# Patient Record
Sex: FEMALE | Race: OTHER
[De-identification: names, ages, dates, MRNs, and addresses within clinical notes are randomized per-mention and may not be internally consistent; named-entity substitution may affect disease eponyms.]

---

## 2019-12-25 ENCOUNTER — HOSPITAL ENCOUNTER (EMERGENCY)
Dept: HOSPITAL 61 - ER | Age: 74
Discharge: HOME | End: 2019-12-25
Payer: MEDICARE

## 2019-12-25 VITALS — DIASTOLIC BLOOD PRESSURE: 94 MMHG | SYSTOLIC BLOOD PRESSURE: 138 MMHG

## 2019-12-25 VITALS — BODY MASS INDEX: 39.07 KG/M2 | HEIGHT: 60 IN | WEIGHT: 199 LBS

## 2019-12-25 DIAGNOSIS — E11.9: ICD-10-CM

## 2019-12-25 DIAGNOSIS — J45.901: Primary | ICD-10-CM

## 2019-12-25 DIAGNOSIS — E03.9: ICD-10-CM

## 2019-12-25 DIAGNOSIS — J06.9: ICD-10-CM

## 2019-12-25 DIAGNOSIS — Z91.041: ICD-10-CM

## 2019-12-25 DIAGNOSIS — I10: ICD-10-CM

## 2019-12-25 PROCEDURE — 71046 X-RAY EXAM CHEST 2 VIEWS: CPT

## 2019-12-25 PROCEDURE — 94640 AIRWAY INHALATION TREATMENT: CPT

## 2019-12-25 PROCEDURE — 99284 EMERGENCY DEPT VISIT MOD MDM: CPT

## 2019-12-25 RX ADMIN — IPRATROPIUM BROMIDE AND ALBUTEROL SULFATE ONE ML: .5; 3 SOLUTION RESPIRATORY (INHALATION) at 19:47

## 2019-12-25 NOTE — RAD
CHEST PA   LATERAL

 

History: Cough, wheezing 

 

Comparison: None.

 

Findings: 

Frontal and lateral views of the chest were obtained. The 

cardiomediastinal silhouette is normal. Pulmonary vasculature is normal. 

The lungs are clear. No pleural effusion or pneumothorax is seen. There is

no acute bone abnormality. Degenerative changes of the cervical spine 

noted.

 

IMPRESSION: 

No acute cardiopulmonary process. 

 

 

Electronically signed by: Rashad Zaidi MD (12/25/2019 9:43 PM) [facility]
No

## 2019-12-25 NOTE — PHYS DOC
Adult General


Chief Complaint


Chief Complaint:  Congestion





HPI


HPI





74-year-old female presents to the emergency department with cough and 

congestion times one day. She does have positive sputum production. She 

describes symptoms started yesterday afternoon. She did have fever last night 

however that has subsequently resolved. She denies any nausea, vomiting, 

diarrhea. States her great-grandchildren have both had RSV.  Patient's un

derlying history of hypertension, diabetes, asthma, hypothyroid.  Nothing makes 

her symptoms worse, nothing makes her symptoms better on exam.





Review of Systems


Review of Systems





Constitutional: fever last night, not currently


Eyes: Denies change in visual acuity, redness, or eye pain []


HENT: + congestion


Respiratory: cough, SOB at times


Cardiovascular: No additional information not addressed in HPI []


GI: Denies abdominal pain, nausea, vomiting, bloody stools or diarrhea []


Neurologic: Denies headache, focal weakness or sensory changes []





All other systems were reviewed and found to be within normal limits, except as 

documented in this note.





Current Medications


Current Medications





Current Medications








 Medications


  (Trade)  Dose


 Ordered  Sig/Yann  Start Time


 Stop Time Status Last Admin


Dose Admin


 


 Albuterol/


 Ipratropium


  (Duoneb)  3 ml  1X  ONCE  12/25/19 19:45


 12/25/19 19:46 DC 12/25/19 19:47


3 ML


 


 Prednisone


  (Prednisone)  60 mg  1X  ONCE  12/25/19 19:45


 12/25/19 19:46 DC 12/25/19 19:43


60 MG











Allergies


Allergies





Allergies








Coded Allergies Type Severity Reaction Last Updated Verified


 


  Iodinated Contrast Media Allergy Intermediate  12/25/19 Yes











Physical Exam


Physical Exam





Constitutional: Well developed, well nourished, no acute distress, non-toxic 

appearance. []


HENT: Normocephalic, atraumatic, bilateral external ears normal, oropharynx 

moist, no oral exudates, nose normal. []


Neck: Normal range of motion, no tenderness, supple, no stridor. [] 


Cardiovascular:Heart rate regular rhythm, no murmur []


Lungs & Thorax:  decreased BS, occasional wheeze


Abdomen: Bowel sounds normal, soft, no tenderness, no masses, no pulsatile 

masses. [] 


Skin: Warm, dry, no erythema, no rash. [] 


Back: No tenderness, no CVA tenderness. [] 


Extremities: No tenderness, no edema. [] 


Neurologic: Alert and oriented X 3, no focal deficits noted. []


Psychologic: Affect normal, judgement normal, mood normal. []





Current Patient Data


Vital Signs





                                   Vital Signs








  Date Time  Temp Pulse Resp B/P (MAP) Pulse Ox O2 Delivery O2 Flow Rate FiO2


 


12/25/19 19:50      Room Air  


 


12/25/19 19:16 97.8 86 16 138/94 (109) 93   





 97.8       











EKG


EKG


[]





Radiology/Procedures


Radiology/Procedures


Wet read reveals no acute consolidation appreciated, official read pending[]





Course & Med Decision Making


Course & Med Decision Making


Pertinent Labs and Imaging studies reviewed. (See chart for details)





[]


74-year-old female presents to the emergency department with cough and 

congestion times one day. She does have positive sputum production. She 

describes symptoms started yesterday afternoon. She did have fever last night 

however that has subsequently resolved. She denies any nausea, vomiting, 

diarrhea. States her great-grandchildren have both had RSV.  Patient's 

underlying history of hypertension, diabetes, asthma, hypothyroid.  Nothing 

makes her symptoms worse, nothing makes her symptoms better on exam.





Duoneb x 1 


Prednisone 60mg po x 1


Xray without acute consolidation appreciated 


Discussed with patient - recommend follow up with PCP, continue albuterol as 

needed per home, plan steroid burst over the next 2 days


Discussed discharge plans with patient.





Dragon Disclaimer


Dragon Disclaimer


This electronic medical record was generated, in whole or in part, using a voice

 recognition dictation system.





Departure


Departure


Impression:  


   Primary Impression:  


   URI (upper respiratory infection)


   Additional Impression:  


   Acute asthma flare


Disposition:  01 HOME, SELF-CARE


Condition:  IMPROVED


Referrals:  


NO PCP (PCP)


Patient Instructions:  Asthma, Adult, Easy-to-Read, Upper Respiratory Infection,

 Adult, Easy-to-Read





Additional Instructions:  


Recommend follow up with PCP 3 - 5 days


Return to the ER with worsening symptoms, intractable pain, fever, altered 

mental status


Tylenol/Motrin as needed for pain


No antibiotics at this time


CXR without acute process identified


Prednisone 60mg po x 2 days


Scripts


[Prednisone] 10 mg  No Conflict Check


60 MG PO DAILY for 2 Days, #12 %


   Prov: VALENTE BURNS MD         12/25/19





Problem Qualifiers








   Primary Impression:  


   URI (upper respiratory infection)


   URI type:  unspecified URI  Qualified Codes:  J06.9 - Acute upper respiratory

    infection, unspecified


   Additional Impression:  


   Acute asthma flare


   Asthma severity:  mild  Asthma persistence:  unspecified  Qualified Codes:  

   J45.901 - Unspecified asthma with (acute) exacerbation








VALENTE BURNS MD              Dec 25, 2019 19:31

## 2019-12-28 ENCOUNTER — HOSPITAL ENCOUNTER (EMERGENCY)
Dept: HOSPITAL 61 - ER | Age: 74
Discharge: HOME | End: 2019-12-28
Payer: MEDICARE

## 2019-12-28 VITALS
SYSTOLIC BLOOD PRESSURE: 151 MMHG | SYSTOLIC BLOOD PRESSURE: 151 MMHG | DIASTOLIC BLOOD PRESSURE: 92 MMHG | DIASTOLIC BLOOD PRESSURE: 92 MMHG

## 2019-12-28 VITALS — WEIGHT: 200 LBS | BODY MASS INDEX: 39.27 KG/M2 | HEIGHT: 60 IN

## 2019-12-28 DIAGNOSIS — I10: ICD-10-CM

## 2019-12-28 DIAGNOSIS — Z91.041: ICD-10-CM

## 2019-12-28 DIAGNOSIS — E03.9: ICD-10-CM

## 2019-12-28 DIAGNOSIS — J45.909: Primary | ICD-10-CM

## 2019-12-28 DIAGNOSIS — E11.9: ICD-10-CM

## 2019-12-28 DIAGNOSIS — Z91.012: ICD-10-CM

## 2019-12-28 LAB
% LYMPHS: 7 % (ref 24–48)
% MONOS: 3 % (ref 0–10)
% SEGS: 89 % (ref 35–66)
ALBUMIN SERPL-MCNC: 3.7 G/DL (ref 3.4–5)
ALBUMIN/GLOB SERPL: 1 {RATIO} (ref 1–1.7)
ALP SERPL-CCNC: 175 U/L (ref 46–116)
ALT SERPL-CCNC: 27 U/L (ref 14–59)
ANION GAP SERPL CALC-SCNC: 7 MMOL/L (ref 6–14)
AST SERPL-CCNC: 22 U/L (ref 15–37)
BASOPHILS # BLD AUTO: 0.1 X10^3/UL (ref 0–0.2)
BASOPHILS NFR BLD: 1 % (ref 0–3)
BILIRUB SERPL-MCNC: 0.5 MG/DL (ref 0.2–1)
BUN SERPL-MCNC: 22 MG/DL (ref 7–20)
BUN/CREAT SERPL: 24 (ref 6–20)
CALCIUM SERPL-MCNC: 8.9 MG/DL (ref 8.5–10.1)
CHLORIDE SERPL-SCNC: 100 MMOL/L (ref 98–107)
CO2 SERPL-SCNC: 31 MMOL/L (ref 21–32)
CREAT SERPL-MCNC: 0.9 MG/DL (ref 0.6–1)
EOSINOPHIL NFR BLD AUTO: 1 % (ref 0–5)
EOSINOPHIL NFR BLD: 0.1 X10^3/UL (ref 0–0.7)
EOSINOPHIL NFR BLD: 1 % (ref 0–3)
ERYTHROCYTE [DISTWIDTH] IN BLOOD BY AUTOMATED COUNT: 12.9 % (ref 11.5–14.5)
GFR SERPLBLD BASED ON 1.73 SQ M-ARVRAT: 61.5 ML/MIN
GLOBULIN SER-MCNC: 3.6 G/DL (ref 2.2–3.8)
GLUCOSE SERPL-MCNC: 263 MG/DL (ref 70–99)
HCT VFR BLD CALC: 38.3 % (ref 36–47)
HGB BLD-MCNC: 12.5 G/DL (ref 12–15.5)
INFLUENZA A PATIENT: NEGATIVE
INFLUENZA B PATIENT: NEGATIVE
LYMPHOCYTES # BLD: 0.8 X10^3/UL (ref 1–4.8)
LYMPHOCYTES NFR BLD AUTO: 7 % (ref 24–48)
MCH RBC QN AUTO: 30 PG (ref 25–35)
MCHC RBC AUTO-ENTMCNC: 33 G/DL (ref 31–37)
MCV RBC AUTO: 91 FL (ref 79–100)
MONO #: 0.5 X10^3/UL (ref 0–1.1)
MONOCYTES NFR BLD: 5 % (ref 0–9)
NEUT #: 9.6 X10^3/UL (ref 1.8–7.7)
NEUTROPHILS NFR BLD AUTO: 87 % (ref 31–73)
PLATELET # BLD AUTO: 274 X10^3/UL (ref 140–400)
PLATELET # BLD EST: ADEQUATE 10*3/UL
POTASSIUM SERPL-SCNC: 3.2 MMOL/L (ref 3.5–5.1)
PROT SERPL-MCNC: 7.3 G/DL (ref 6.4–8.2)
RBC # BLD AUTO: 4.23 X10^6/UL (ref 3.5–5.4)
SODIUM SERPL-SCNC: 138 MMOL/L (ref 136–145)
WBC # BLD AUTO: 11.1 X10^3/UL (ref 4–11)

## 2019-12-28 PROCEDURE — 99285 EMERGENCY DEPT VISIT HI MDM: CPT

## 2019-12-28 PROCEDURE — 85007 BL SMEAR W/DIFF WBC COUNT: CPT

## 2019-12-28 PROCEDURE — 85025 COMPLETE CBC W/AUTO DIFF WBC: CPT

## 2019-12-28 PROCEDURE — 80053 COMPREHEN METABOLIC PANEL: CPT

## 2019-12-28 PROCEDURE — 71045 X-RAY EXAM CHEST 1 VIEW: CPT

## 2019-12-28 PROCEDURE — 84484 ASSAY OF TROPONIN QUANT: CPT

## 2019-12-28 PROCEDURE — 83605 ASSAY OF LACTIC ACID: CPT

## 2019-12-28 PROCEDURE — 36415 COLL VENOUS BLD VENIPUNCTURE: CPT

## 2019-12-28 PROCEDURE — 94640 AIRWAY INHALATION TREATMENT: CPT

## 2019-12-28 PROCEDURE — 85379 FIBRIN DEGRADATION QUANT: CPT

## 2019-12-28 PROCEDURE — 83880 ASSAY OF NATRIURETIC PEPTIDE: CPT

## 2019-12-28 PROCEDURE — 87804 INFLUENZA ASSAY W/OPTIC: CPT

## 2019-12-28 PROCEDURE — 93005 ELECTROCARDIOGRAM TRACING: CPT

## 2019-12-28 NOTE — PHYS DOC
Past Medical History


Past Medical History:  Asthma, Diabetes-Type II, Hypertension, Hypothyroid


Past Surgical History:  Cholecystectomy, Hysterectomy, Tubal ligation


Additional Past Surgical Histo:  KIDNEY STENTS


Alcohol Use:  None


Drug Use:  None





Adult General


Chief Complaint


Chief Complaint:  SHORTNESS OF BREATH





HPI


HPI





74-year-old female with underlying history of asthma, hypertension presents to [facility] emergency department with complaints of shortness of breath, wheeze. Patient 

was seen here on [address] by me with diagnosis of URI, steroids were 

provided, nebulizer treatments with improvement. Socially discharge home, states

that over the last couple days she's had increasing shortness of breath, 

difficulty lying flat, more productive cough as well as chest pressure. She 

denies any headache or visual change, abdominal pain, nausea, vomiting.





Review of Systems


Review of Systems





Constitutional: Denies fever or chills []


HENT: Denies nasal congestion or sore throat []


Respiratory: Cough/SOB


Cardiovascular: No additional information not addressed in HPI []


GI: Denies abdominal pain, nausea, vomiting, bloody stools or diarrhea []


Neurologic: Denies headache, focal weakness or sensory changes []








All other systems were reviewed and found to be within normal limits, except as 

documented in this note.





Current Medications


Current Medications





Current Medications








 Medications


  (Trade)  Dose


 Ordered  Sig/Yann  Start Time


 Stop Time Status Last Admin


Dose Admin


 


 Albuterol/


 Ipratropium


  (Duoneb)  3 ml  1X  ONCE  12/28/19 21:45


 12/28/19 21:46 DC 12/28/19 22:16


3 ML


 


 Potassium Chloride


  (Klor-Con)  40 meq  1X  ONCE  12/28/19 22:45


 12/28/19 22:46 DC 12/28/19 22:47


40 MEQ











Allergies


Allergies





Allergies








Coded Allergies Type Severity Reaction Last Updated Verified


 


  Iodinated Contrast Media Allergy Intermediate  12/25/19 Yes


 


  egg Allergy Unknown  12/28/19 Yes











Physical Exam


Physical Exam





Constitutional: Well developed, well nourished, mild distress, non-toxic 

appearance. []


HENT: Normocephalic, atraumatic, bilateral external ears normal, oropharynx 

moist, no oral exudates, nose normal. []


Eyes: PERRLA, EOMI, conjunctiva normal, no discharge. [] 


Cardiovascular:Heart rate regular rhythm, no murmur []


Lungs & Thorax:  decreased BS, occasional wheeze


Abdomen: Bowel sounds normal, soft, no tenderness, no masses, no pulsatile 

masses. [] 


Skin: Warm, dry, no erythema, no rash. [] 


Extremities: No tenderness, no edema. [] 


Neurologic: Alert and oriented X 3, no focal deficits noted. []


Psychologic: Affect normal, judgement normal, mood normal. []





Current Patient Data


Vital Signs





                                   Vital Signs








  Date Time  Temp Pulse Resp B/P (MAP) Pulse Ox O2 Delivery O2 Flow Rate FiO2


 


12/28/19 22:15     95 Room Air  


 


12/28/19 21:16 98.2 88 20 160/100 (120)    





 98.2       








Lab Values





                                Laboratory Tests








Test


 12/28/19


21:10 12/28/19


21:50


 


White Blood Count


 11.1 x10^3/uL


(4.0-11.0)  H 





 


Red Blood Count


 4.23 x10^6/uL


(3.50-5.40) 





 


Hemoglobin


 12.5 g/dL


(12.0-15.5) 





 


Hematocrit


 38.3 %


(36.0-47.0) 





 


Mean Corpuscular Volume


 91 fL ()


 





 


Mean Corpuscular Hemoglobin 30 pg (25-35)   


 


Mean Corpuscular Hemoglobin


Concent 33 g/dL


(31-37) 





 


Red Cell Distribution Width


 12.9 %


(11.5-14.5) 





 


Platelet Count


 274 x10^3/uL


(140-400) 





 


Neutrophils (%) (Auto) 87 % (31-73)  H 


 


Lymphocytes (%) (Auto) 7 % (24-48)  L 


 


Monocytes (%) (Auto) 5 % (0-9)   


 


Eosinophils (%) (Auto) 1 % (0-3)   


 


Basophils (%) (Auto) 1 % (0-3)   


 


Neutrophils # (Auto)


 9.6 x10^3/uL


(1.8-7.7)  H 





 


Lymphocytes # (Auto)


 0.8 x10^3/uL


(1.0-4.8)  L 





 


Monocytes # (Auto)


 0.5 x10^3/uL


(0.0-1.1) 





 


Eosinophils # (Auto)


 0.1 x10^3/uL


(0.0-0.7) 





 


Basophils # (Auto)


 0.1 x10^3/uL


(0.0-0.2) 





 


Segmented Neutrophils % 89 % (35-66)  H 


 


Lymphocytes % 7 % (24-48)  L 


 


Monocytes % 3 % (0-10)   


 


Eosinophils % 1 % (0-5)   


 


Platelet Estimate


 Adequate


(ADEQUATE) 





 


D-Dimer (Missy)


 0.78 ug/mlFEU


(0.00-0.50)  H 





 


Sodium Level


 138 mmol/L


(136-145) 





 


Potassium Level


 3.2 mmol/L


(3.5-5.1)  L 





 


Chloride Level


 100 mmol/L


() 





 


Carbon Dioxide Level


 31 mmol/L


(21-32) 





 


Anion Gap 7 (6-14)   


 


Blood Urea Nitrogen


 22 mg/dL


(7-20)  H 





 


Creatinine


 0.9 mg/dL


(0.6-1.0) 





 


Estimated GFR


(Cockcroft-Gault) 61.5  


 





 


BUN/Creatinine Ratio 24 (6-20)  H 


 


Glucose Level


 263 mg/dL


(70-99)  H 





 


Lactic Acid Level


 1.4 mmol/L


(0.4-2.0) 





 


Calcium Level


 8.9 mg/dL


(8.5-10.1) 





 


Total Bilirubin


 0.5 mg/dL


(0.2-1.0) 





 


Aspartate Amino Transferase


(AST) 22 U/L (15-37)


 





 


Alanine Aminotransferase (ALT)


 27 U/L (14-59)


 





 


Alkaline Phosphatase


 175 U/L


()  H 





 


Troponin I Quantitative


 < 0.017 ng/mL


(0.000-0.055) 





 


NT-Pro-B-Type Natriuretic


Peptide 341 pg/mL


(0-124)  H 





 


Total Protein


 7.3 g/dL


(6.4-8.2) 





 


Albumin


 3.7 g/dL


(3.4-5.0) 





 


Albumin/Globulin Ratio 1.0 (1.0-1.7)   


 


Influenza Type A Antigen


 


 Negative


(NEGATIVE)


 


Influenza Type B Antigen


 


 Negative


(NEGATIVE)





                                Laboratory Tests


12/28/19 21:10








                                Laboratory Tests


12/28/19 21:10











EKG


EKG


EKG reveals normal sinus rhythm, heart rate 85, left axis deviation, no evidence

 of ST elevation MI, interpretation time 2139[]





Radiology/Procedures


Radiology/Procedures


CXR wet read without acute process identified[]





Course & Med Decision Making


Course & Med Decision Making


Pertinent Labs and Imaging studies reviewed. (See chart for details)





[]74-year-old female with underlying history of asthma, hypertension presents to

 the emergency department with complaints of shortness of breath, wheeze. 

Patient was seen here on [address] by me with diagnosis of URI, steroids 

were provided, nebulizer treatments with improvement. Socially discharge home, 

states that over the last couple days she's had increasing shortness of breath, 

difficulty lying flat, more productive cough as well as chest pressure. She 

denies any headache or visual change, abdominal pain, nausea, vomiting.





Labs/Imaging reviewed


No evidence of acute consolidation appreciated to CXR


Lactic 1.4, Trop nml, K 3.2


Patient improved with duoneb in ER


Ddimer 0.78, VTE unlikely based on age adjusted


Ambulatory O2 - 92%


Discussed dc home with abx and recommend follow up with PCP


Return precautions provided





Dragon Disclaimer


Dragon Disclaimer


This electronic medical record was generated, in whole or in part, using a voice

 recognition dictation system.





Departure


Departure


Impression:  


   Primary Impression:  


   Bronchitis


   Additional Impression:  


   Dyspnea


Disposition:  01 HOME, SELF-CARE


Condition:  IMPROVED


Referrals:  


NO PCP (PCP)


Patient Instructions:  Bronchitis, Easy-to-Read





Additional Instructions:  


Recommend follow up with PCP 3 - 5 days


Return to the ER with worsening symptoms, intractable pain, fever, altered 

mental status


Tylenol/Motrin as needed for pain


Take antibiotics as directed


Scripts


Azithromycin (AZITHROMYCIN TABLET) 500 Mg Tablet


1 TAB PO DAILY for 5 Days, #5 TAB 0 Refills


   Prov: VALENTE BURNS MD         12/28/19





Problem Qualifiers








   Additional Impression:  


   Dyspnea


   Dyspnea type:  shortness of breath  Qualified Codes:  R06.02 - Shortness of 

   breath








VALENTE BURNS MD              Dec 28, 2019 21:44

## 2019-12-29 NOTE — RAD
EXAM: CHEST 1 VIEW 

 

History: Chest pressure, shortness of breath 

 

COMPARISON: 12/25/2019

 

TECHNIQUE: Single portable radiograph of the chest

 

FINDINGS:  The cardiac silhouette is unremarkable. The lungs are clear 

bilaterally. The costophrenic sulci are clear and well demarcated.

 

IMPRESSION:  No radiographic evidence of an acute cardiopulmonary process.

 

 

Electronically signed by: Peña Lion MD (12/29/2019 7:53 AM) [facility]

## 2019-12-30 NOTE — EKG
[facility]

              [address] [place] [address]

Test Date:    2019               Test Time:    21:10:46

Pat Name:     FROY MARIN             Department:   

Patient ID:   [id number]           Room:          

Gender:       F                        Technician:   

:          [id number]               Requested By: VALENTE BURNS

Order Number: [id number]           Reading MD:     

                                 Measurements

Intervals                              Axis          

Rate:         84                       P:            -38

FL:           106                      QRS:          -14

QRSD:         90                       T:            -14

QT:           434                                    

QTc:          516                                    

                           Interpretive Statements

SUPRAVENTRICULAR RHYTHM

LEFTWARD AXIS

R-S TRANSITION ZONE IN V LEADS DISPLACED TO THE LEFT

T ABNORMALITY IN INFERIOR LEADS

PROLONGED QT

NON SPECIFIC ST DEPRESSION

ABNORMAL ECG

No previous ECG available for comparison

## 2020-02-29 ENCOUNTER — HOSPITAL ENCOUNTER (OUTPATIENT)
Dept: HOSPITAL 61 - MAMMO | Age: 75
Discharge: HOME | End: 2020-02-29
Attending: FAMILY MEDICINE
Payer: MEDICARE

## 2020-02-29 DIAGNOSIS — Z12.31: Primary | ICD-10-CM

## 2020-02-29 PROCEDURE — 77067 SCR MAMMO BI INCL CAD: CPT

## 2020-02-29 PROCEDURE — 77063 BREAST TOMOSYNTHESIS BI: CPT

## 2020-03-04 NOTE — RAD
History:  Routine screening.



Technique:  Bilateral digital mammographic routine views were obtained with

2-D and 3-D technique including use of CAD - computer aided detection.



Comparison: None. This is a new baseline..



Findings:



Breast Tissue Density B :The breast tissue is composed of mixed fatty and

fibroglandular tissue.



There are no suspicious masses, microcalcifications or areas of architectural

distortion.



Impression:



Negative mammogram.



BI-RADS Category 1:  Negative.



Normal interval followup.



A mammogram does not have 100% sensitivity and therefore a negative imaging

study should not delay further work up of a suspicious abnormality.





The patient will receive a letter with the results in the mail.



Patient information is entered into the reminder system with a target due date

for the next screening mammogram.  The patient will receive a reminder.



"Our facility is accredited by the [employer] Mammography

Program."





BI-RADS 1 -- negative findings (within normal)